# Patient Record
Sex: FEMALE | Race: WHITE | NOT HISPANIC OR LATINO | URBAN - METROPOLITAN AREA
[De-identification: names, ages, dates, MRNs, and addresses within clinical notes are randomized per-mention and may not be internally consistent; named-entity substitution may affect disease eponyms.]

---

## 2020-01-01 ENCOUNTER — HOSPITAL ENCOUNTER (INPATIENT)
Facility: OTHER | Age: 85
LOS: 1 days | DRG: 872 | End: 2020-10-13
Attending: HOSPITALIST | Admitting: HOSPITALIST
Payer: MEDICARE

## 2020-01-01 VITALS
WEIGHT: 144.19 LBS | BODY MASS INDEX: 25.55 KG/M2 | RESPIRATION RATE: 42 BRPM | HEIGHT: 63 IN | HEART RATE: 104 BPM | TEMPERATURE: 98 F | OXYGEN SATURATION: 88 % | SYSTOLIC BLOOD PRESSURE: 93 MMHG | DIASTOLIC BLOOD PRESSURE: 50 MMHG

## 2020-01-01 DIAGNOSIS — I48.91 A-FIB: ICD-10-CM

## 2020-01-01 DIAGNOSIS — B99.9 BLOOD INFECTION: Primary | ICD-10-CM

## 2020-01-01 DIAGNOSIS — A41.9 SEPSIS: ICD-10-CM

## 2020-01-01 DIAGNOSIS — I48.91 ATRIAL FIBRILLATION: ICD-10-CM

## 2020-01-01 DIAGNOSIS — R07.9 CHEST PAIN: ICD-10-CM

## 2020-01-01 DIAGNOSIS — E87.5 HYPERKALEMIA: ICD-10-CM

## 2020-01-01 LAB
ANION GAP SERPL CALC-SCNC: 15 MMOL/L (ref 8–16)
ANION GAP SERPL CALC-SCNC: 16 MMOL/L (ref 8–16)
BACTERIA #/AREA URNS HPF: ABNORMAL /HPF
BASOPHILS # BLD AUTO: 0.08 K/UL (ref 0–0.2)
BASOPHILS # BLD AUTO: ABNORMAL K/UL (ref 0–0.2)
BASOPHILS NFR BLD: 0 % (ref 0–1.9)
BASOPHILS NFR BLD: 0.4 % (ref 0–1.9)
BILIRUB UR QL STRIP: ABNORMAL
BNP SERPL-MCNC: 300 PG/ML (ref 0–99)
BUN SERPL-MCNC: 89 MG/DL (ref 8–23)
BUN SERPL-MCNC: 91 MG/DL (ref 8–23)
CALCIUM SERPL-MCNC: 10.1 MG/DL (ref 8.7–10.5)
CALCIUM SERPL-MCNC: 10.3 MG/DL (ref 8.7–10.5)
CHLORIDE SERPL-SCNC: 111 MMOL/L (ref 95–110)
CHLORIDE SERPL-SCNC: 112 MMOL/L (ref 95–110)
CLARITY UR: ABNORMAL
CO2 SERPL-SCNC: 12 MMOL/L (ref 23–29)
CO2 SERPL-SCNC: 13 MMOL/L (ref 23–29)
COLOR UR: YELLOW
CREAT SERPL-MCNC: 3.4 MG/DL (ref 0.5–1.4)
CREAT SERPL-MCNC: 3.4 MG/DL (ref 0.5–1.4)
CREAT UR-MCNC: 82.5 MG/DL (ref 15–325)
CTP QC/QA: YES
DIFFERENTIAL METHOD: ABNORMAL
DIFFERENTIAL METHOD: ABNORMAL
EOSINOPHIL # BLD AUTO: 0 K/UL (ref 0–0.5)
EOSINOPHIL # BLD AUTO: ABNORMAL K/UL (ref 0–0.5)
EOSINOPHIL NFR BLD: 0.2 % (ref 0–8)
EOSINOPHIL NFR BLD: 2 % (ref 0–8)
ERYTHROCYTE [DISTWIDTH] IN BLOOD BY AUTOMATED COUNT: 14.4 % (ref 11.5–14.5)
ERYTHROCYTE [DISTWIDTH] IN BLOOD BY AUTOMATED COUNT: 14.5 % (ref 11.5–14.5)
EST. GFR  (AFRICAN AMERICAN): 13 ML/MIN/1.73 M^2
EST. GFR  (AFRICAN AMERICAN): 13 ML/MIN/1.73 M^2
EST. GFR  (NON AFRICAN AMERICAN): 11 ML/MIN/1.73 M^2
EST. GFR  (NON AFRICAN AMERICAN): 11 ML/MIN/1.73 M^2
GLUCOSE SERPL-MCNC: 77 MG/DL (ref 70–110)
GLUCOSE SERPL-MCNC: 77 MG/DL (ref 70–110)
GLUCOSE UR QL STRIP: NEGATIVE
HCT VFR BLD AUTO: 45.5 % (ref 37–48.5)
HCT VFR BLD AUTO: 50 % (ref 37–48.5)
HGB BLD-MCNC: 15.3 G/DL (ref 12–16)
HGB BLD-MCNC: 16.9 G/DL (ref 12–16)
HGB UR QL STRIP: ABNORMAL
HYALINE CASTS #/AREA URNS LPF: 0 /LPF
IMM GRANULOCYTES # BLD AUTO: 0.12 K/UL (ref 0–0.04)
IMM GRANULOCYTES # BLD AUTO: ABNORMAL K/UL (ref 0–0.04)
IMM GRANULOCYTES NFR BLD AUTO: 0.6 % (ref 0–0.5)
IMM GRANULOCYTES NFR BLD AUTO: ABNORMAL % (ref 0–0.5)
KETONES UR QL STRIP: ABNORMAL
LACTATE SERPL-SCNC: 2.1 MMOL/L (ref 0.5–2.2)
LACTATE SERPL-SCNC: 2.8 MMOL/L (ref 0.5–2.2)
LEUKOCYTE ESTERASE UR QL STRIP: ABNORMAL
LYMPHOCYTES # BLD AUTO: 0.6 K/UL (ref 1–4.8)
LYMPHOCYTES # BLD AUTO: ABNORMAL K/UL (ref 1–4.8)
LYMPHOCYTES NFR BLD: 2.8 % (ref 18–48)
LYMPHOCYTES NFR BLD: 5 % (ref 18–48)
MCH RBC QN AUTO: 31 PG (ref 27–31)
MCH RBC QN AUTO: 31.2 PG (ref 27–31)
MCHC RBC AUTO-ENTMCNC: 33.6 G/DL (ref 32–36)
MCHC RBC AUTO-ENTMCNC: 33.8 G/DL (ref 32–36)
MCV RBC AUTO: 92 FL (ref 82–98)
MCV RBC AUTO: 92 FL (ref 82–98)
MICROSCOPIC COMMENT: ABNORMAL
MONOCYTES # BLD AUTO: 1.5 K/UL (ref 0.3–1)
MONOCYTES # BLD AUTO: ABNORMAL K/UL (ref 0.3–1)
MONOCYTES NFR BLD: 6.9 % (ref 4–15)
MONOCYTES NFR BLD: 8 % (ref 4–15)
NEUTROPHILS # BLD AUTO: 19.5 K/UL (ref 1.8–7.7)
NEUTROPHILS NFR BLD: 80 % (ref 38–73)
NEUTROPHILS NFR BLD: 89.1 % (ref 38–73)
NEUTS BAND NFR BLD MANUAL: 5 %
NITRITE UR QL STRIP: NEGATIVE
NRBC BLD-RTO: 0 /100 WBC
NRBC BLD-RTO: 0 /100 WBC
PH UR STRIP: 5 [PH] (ref 5–8)
PLATELET # BLD AUTO: 348 K/UL (ref 150–350)
PLATELET # BLD AUTO: 373 K/UL (ref 150–350)
PLATELET BLD QL SMEAR: ABNORMAL
PLATELET BLD QL SMEAR: ABNORMAL
PMV BLD AUTO: 10.1 FL (ref 9.2–12.9)
PMV BLD AUTO: 9.7 FL (ref 9.2–12.9)
POTASSIUM SERPL-SCNC: 6.5 MMOL/L (ref 3.5–5.1)
POTASSIUM SERPL-SCNC: 6.7 MMOL/L (ref 3.5–5.1)
PROCALCITONIN SERPL IA-MCNC: 8.32 NG/ML
PROT UR QL STRIP: ABNORMAL
RBC # BLD AUTO: 4.93 M/UL (ref 4–5.4)
RBC # BLD AUTO: 5.42 M/UL (ref 4–5.4)
RBC #/AREA URNS HPF: 8 /HPF (ref 0–4)
SARS-COV-2 RDRP RESP QL NAA+PROBE: NEGATIVE
SODIUM SERPL-SCNC: 139 MMOL/L (ref 136–145)
SODIUM SERPL-SCNC: 140 MMOL/L (ref 136–145)
SODIUM UR-SCNC: 24 MMOL/L (ref 20–250)
SP GR UR STRIP: 1.02 (ref 1–1.03)
SQUAMOUS #/AREA URNS HPF: 2 /HPF
TOXIC GRANULES BLD QL SMEAR: PRESENT
TROPONIN I SERPL DL<=0.01 NG/ML-MCNC: 0.35 NG/ML (ref 0–0.03)
TROPONIN I SERPL DL<=0.01 NG/ML-MCNC: 0.38 NG/ML (ref 0–0.03)
TROPONIN I SERPL DL<=0.01 NG/ML-MCNC: 0.41 NG/ML (ref 0–0.03)
URN SPEC COLLECT METH UR: ABNORMAL
UROBILINOGEN UR STRIP-ACNC: 1 EU/DL
UUN UR-MCNC: 571 MG/DL (ref 140–1050)
WBC # BLD AUTO: 20.88 K/UL (ref 3.9–12.7)
WBC # BLD AUTO: 21.81 K/UL (ref 3.9–12.7)
WBC #/AREA URNS HPF: >100 /HPF (ref 0–5)

## 2020-01-01 PROCEDURE — U0002 COVID-19 LAB TEST NON-CDC: HCPCS | Performed by: EMERGENCY MEDICINE

## 2020-01-01 PROCEDURE — 93010 EKG 12-LEAD: ICD-10-PCS | Mod: ,,, | Performed by: INTERNAL MEDICINE

## 2020-01-01 PROCEDURE — 84484 ASSAY OF TROPONIN QUANT: CPT

## 2020-01-01 PROCEDURE — 84145 PROCALCITONIN (PCT): CPT

## 2020-01-01 PROCEDURE — 82570 ASSAY OF URINE CREATININE: CPT

## 2020-01-01 PROCEDURE — 93041 RHYTHM ECG TRACING: CPT

## 2020-01-01 PROCEDURE — 84540 ASSAY OF URINE/UREA-N: CPT

## 2020-01-01 PROCEDURE — 25000003 PHARM REV CODE 250: Performed by: INTERNAL MEDICINE

## 2020-01-01 PROCEDURE — C9113 INJ PANTOPRAZOLE SODIUM, VIA: HCPCS | Performed by: INTERNAL MEDICINE

## 2020-01-01 PROCEDURE — 99285 EMERGENCY DEPT VISIT HI MDM: CPT | Mod: 25

## 2020-01-01 PROCEDURE — 99223 1ST HOSP IP/OBS HIGH 75: CPT | Mod: ,,, | Performed by: INTERNAL MEDICINE

## 2020-01-01 PROCEDURE — 83880 ASSAY OF NATRIURETIC PEPTIDE: CPT

## 2020-01-01 PROCEDURE — 94761 N-INVAS EAR/PLS OXIMETRY MLT: CPT

## 2020-01-01 PROCEDURE — 63600175 PHARM REV CODE 636 W HCPCS: Performed by: INTERNAL MEDICINE

## 2020-01-01 PROCEDURE — 85025 COMPLETE CBC W/AUTO DIFF WBC: CPT

## 2020-01-01 PROCEDURE — 36415 COLL VENOUS BLD VENIPUNCTURE: CPT

## 2020-01-01 PROCEDURE — 93010 ELECTROCARDIOGRAM REPORT: CPT | Mod: ,,, | Performed by: INTERNAL MEDICINE

## 2020-01-01 PROCEDURE — 87040 BLOOD CULTURE FOR BACTERIA: CPT

## 2020-01-01 PROCEDURE — 93005 ELECTROCARDIOGRAM TRACING: CPT

## 2020-01-01 PROCEDURE — 85027 COMPLETE CBC AUTOMATED: CPT

## 2020-01-01 PROCEDURE — 99223 PR INITIAL HOSPITAL CARE,LEVL III: ICD-10-PCS | Mod: ,,, | Performed by: INTERNAL MEDICINE

## 2020-01-01 PROCEDURE — 81000 URINALYSIS NONAUTO W/SCOPE: CPT

## 2020-01-01 PROCEDURE — 20000000 HC ICU ROOM

## 2020-01-01 PROCEDURE — 63600175 PHARM REV CODE 636 W HCPCS: Performed by: PHYSICIAN ASSISTANT

## 2020-01-01 PROCEDURE — 84300 ASSAY OF URINE SODIUM: CPT

## 2020-01-01 PROCEDURE — 25000003 PHARM REV CODE 250: Performed by: PHYSICIAN ASSISTANT

## 2020-01-01 PROCEDURE — 99900035 HC TECH TIME PER 15 MIN (STAT)

## 2020-01-01 PROCEDURE — 83605 ASSAY OF LACTIC ACID: CPT

## 2020-01-01 PROCEDURE — 80048 BASIC METABOLIC PNL TOTAL CA: CPT

## 2020-01-01 PROCEDURE — 85007 BL SMEAR W/DIFF WBC COUNT: CPT

## 2020-01-01 PROCEDURE — 87086 URINE CULTURE/COLONY COUNT: CPT

## 2020-01-01 RX ORDER — METOPROLOL TARTRATE 1 MG/ML
5 INJECTION, SOLUTION INTRAVENOUS ONCE
Status: COMPLETED | OUTPATIENT
Start: 2020-01-01 | End: 2020-01-01

## 2020-01-01 RX ORDER — VENLAFAXINE HYDROCHLORIDE 75 MG/1
150 CAPSULE, EXTENDED RELEASE ORAL DAILY
Status: DISCONTINUED | OUTPATIENT
Start: 2020-01-01 | End: 2020-10-14 | Stop reason: HOSPADM

## 2020-01-01 RX ORDER — DILTIAZEM HYDROCHLORIDE 5 MG/ML
10 INJECTION INTRAVENOUS
Status: DISCONTINUED | OUTPATIENT
Start: 2020-01-01 | End: 2020-10-14 | Stop reason: HOSPADM

## 2020-01-01 RX ORDER — SODIUM CHLORIDE 0.9 % (FLUSH) 0.9 %
10 SYRINGE (ML) INJECTION
Status: DISCONTINUED | OUTPATIENT
Start: 2020-01-01 | End: 2020-10-14 | Stop reason: HOSPADM

## 2020-01-01 RX ORDER — ONDANSETRON 2 MG/ML
4 INJECTION INTRAMUSCULAR; INTRAVENOUS EVERY 8 HOURS PRN
Status: DISCONTINUED | OUTPATIENT
Start: 2020-01-01 | End: 2020-10-14 | Stop reason: HOSPADM

## 2020-01-01 RX ORDER — TALC
6 POWDER (GRAM) TOPICAL NIGHTLY PRN
Status: DISCONTINUED | OUTPATIENT
Start: 2020-01-01 | End: 2020-10-14 | Stop reason: HOSPADM

## 2020-01-01 RX ORDER — TRAZODONE HYDROCHLORIDE 50 MG/1
50 TABLET ORAL NIGHTLY
Status: DISCONTINUED | OUTPATIENT
Start: 2020-01-01 | End: 2020-10-14 | Stop reason: HOSPADM

## 2020-01-01 RX ORDER — SODIUM CHLORIDE 9 MG/ML
INJECTION, SOLUTION INTRAVENOUS CONTINUOUS
Status: DISCONTINUED | OUTPATIENT
Start: 2020-01-01 | End: 2020-01-01

## 2020-01-01 RX ORDER — DILTIAZEM HCL 1 MG/ML
10 INJECTION, SOLUTION INTRAVENOUS CONTINUOUS
Status: DISCONTINUED | OUTPATIENT
Start: 2020-01-01 | End: 2020-10-14 | Stop reason: HOSPADM

## 2020-01-01 RX ORDER — METOPROLOL TARTRATE 25 MG/1
25 TABLET, FILM COATED ORAL 2 TIMES DAILY
Status: DISCONTINUED | OUTPATIENT
Start: 2020-01-01 | End: 2020-01-01

## 2020-01-01 RX ORDER — DONEPEZIL HYDROCHLORIDE 5 MG/1
5 TABLET, FILM COATED ORAL NIGHTLY
Status: DISCONTINUED | OUTPATIENT
Start: 2020-01-01 | End: 2020-01-01

## 2020-01-01 RX ORDER — LEVOFLOXACIN 5 MG/ML
500 INJECTION, SOLUTION INTRAVENOUS
Status: DISCONTINUED | OUTPATIENT
Start: 2020-01-01 | End: 2020-10-14 | Stop reason: HOSPADM

## 2020-01-01 RX ORDER — ACETAMINOPHEN 325 MG/1
650 TABLET ORAL EVERY 4 HOURS PRN
Status: DISCONTINUED | OUTPATIENT
Start: 2020-01-01 | End: 2020-10-14 | Stop reason: HOSPADM

## 2020-01-01 RX ORDER — PANTOPRAZOLE SODIUM 40 MG/10ML
40 INJECTION, POWDER, LYOPHILIZED, FOR SOLUTION INTRAVENOUS DAILY
Status: DISCONTINUED | OUTPATIENT
Start: 2020-01-01 | End: 2020-10-14 | Stop reason: HOSPADM

## 2020-01-01 RX ORDER — LORAZEPAM 0.5 MG/1
0.5 TABLET ORAL DAILY
Status: DISCONTINUED | OUTPATIENT
Start: 2020-01-01 | End: 2020-10-14 | Stop reason: HOSPADM

## 2020-01-01 RX ORDER — MIRTAZAPINE 15 MG/1
30 TABLET, FILM COATED ORAL NIGHTLY
Status: DISCONTINUED | OUTPATIENT
Start: 2020-01-01 | End: 2020-10-14 | Stop reason: HOSPADM

## 2020-01-01 RX ORDER — GLUCAGON 1 MG
1 KIT INJECTION
Status: DISCONTINUED | OUTPATIENT
Start: 2020-01-01 | End: 2020-10-14 | Stop reason: HOSPADM

## 2020-01-01 RX ORDER — IBUPROFEN 200 MG
24 TABLET ORAL
Status: DISCONTINUED | OUTPATIENT
Start: 2020-01-01 | End: 2020-10-14 | Stop reason: HOSPADM

## 2020-01-01 RX ORDER — CEFEPIME HYDROCHLORIDE 1 G/50ML
2 INJECTION, SOLUTION INTRAVENOUS ONCE
Status: DISCONTINUED | OUTPATIENT
Start: 2020-01-01 | End: 2020-10-14 | Stop reason: HOSPADM

## 2020-01-01 RX ORDER — ACETAMINOPHEN 325 MG/1
650 TABLET ORAL EVERY 8 HOURS PRN
Status: DISCONTINUED | OUTPATIENT
Start: 2020-01-01 | End: 2020-10-14 | Stop reason: HOSPADM

## 2020-01-01 RX ORDER — LORAZEPAM 1 MG/1
1 TABLET ORAL EVERY MORNING
Status: DISCONTINUED | OUTPATIENT
Start: 2020-01-01 | End: 2020-10-14 | Stop reason: HOSPADM

## 2020-01-01 RX ORDER — IBUPROFEN 200 MG
16 TABLET ORAL
Status: DISCONTINUED | OUTPATIENT
Start: 2020-01-01 | End: 2020-10-14 | Stop reason: HOSPADM

## 2020-01-01 RX ORDER — FUROSEMIDE 10 MG/ML
60 INJECTION INTRAMUSCULAR; INTRAVENOUS ONCE
Status: COMPLETED | OUTPATIENT
Start: 2020-01-01 | End: 2020-01-01

## 2020-01-01 RX ORDER — MUPIROCIN 20 MG/G
OINTMENT TOPICAL 2 TIMES DAILY
Status: DISCONTINUED | OUTPATIENT
Start: 2020-01-01 | End: 2020-10-14 | Stop reason: HOSPADM

## 2020-01-01 RX ADMIN — SODIUM CHLORIDE: 0.9 INJECTION, SOLUTION INTRAVENOUS at 10:10

## 2020-01-01 RX ADMIN — PANTOPRAZOLE SODIUM 40 MG: 40 INJECTION, POWDER, LYOPHILIZED, FOR SOLUTION INTRAVENOUS at 10:10

## 2020-01-01 RX ADMIN — SODIUM ZIRCONIUM CYCLOSILICATE 10 G: 10 POWDER, FOR SUSPENSION ORAL at 07:10

## 2020-01-01 RX ADMIN — LEVOFLOXACIN 500 MG: 500 INJECTION, SOLUTION INTRAVENOUS at 11:10

## 2020-01-01 RX ADMIN — LORAZEPAM 0.5 MG: 0.5 TABLET ORAL at 11:10

## 2020-01-01 RX ADMIN — VENLAFAXINE HYDROCHLORIDE 150 MG: 75 CAPSULE, EXTENDED RELEASE ORAL at 10:10

## 2020-01-01 RX ADMIN — METOPROLOL TARTRATE 5 MG: 5 INJECTION, SOLUTION INTRAVENOUS at 11:10

## 2020-01-01 RX ADMIN — DILTIAZEM HYDROCHLORIDE 10 MG: 5 INJECTION INTRAVENOUS at 03:10

## 2020-01-01 RX ADMIN — FUROSEMIDE 60 MG: 10 INJECTION, SOLUTION INTRAMUSCULAR; INTRAVENOUS at 07:10

## 2020-01-01 RX ADMIN — MIRTAZAPINE 30 MG: 15 TABLET, FILM COATED ORAL at 08:10

## 2020-01-01 RX ADMIN — DILTIAZEM HYDROCHLORIDE 10 MG/HR: 5 INJECTION INTRAVENOUS at 06:10

## 2020-01-01 RX ADMIN — DILTIAZEM HYDROCHLORIDE 10 MG: 5 INJECTION INTRAVENOUS at 05:10

## 2020-01-01 RX ADMIN — TRAZODONE HYDROCHLORIDE 50 MG: 50 TABLET ORAL at 08:10

## 2020-01-01 RX ADMIN — MUPIROCIN: 20 OINTMENT TOPICAL at 11:10

## 2020-10-13 PROBLEM — I48.91 ATRIAL FIBRILLATION WITH RAPID VENTRICULAR RESPONSE: Status: ACTIVE | Noted: 2020-01-01

## 2020-10-13 PROBLEM — A41.9 SEPSIS: Status: ACTIVE | Noted: 2020-01-01

## 2020-10-13 PROBLEM — I48.91 A-FIB: Status: ACTIVE | Noted: 2020-01-01

## 2020-10-13 PROBLEM — R19.7 DIARRHEA: Status: ACTIVE | Noted: 2020-01-01

## 2020-10-13 NOTE — PROVIDER PROGRESS NOTES - EMERGENCY DEPT.
Encounter Date: 10/13/2020    ED Physician Progress Notes         5:48 AM  Patient was a direct admission accepted by Dr. Ro from [facility] for sepsis and GI bleed.  Patient presented to the emergency department only for a COVID 19 test.  Test was negative.  Results were communicated to the hospitalist.  Patient will be transferred upstairs as planned.
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
## 2020-10-13 NOTE — HOSPITAL COURSE
"Patient admitted to inpatient status. See below.    Patient  10/13/2020 - per covering PA - "Called to patient's bedside by Timur, RN/ICU charge nurse. Per patient's RN (Cathy) the patient had been having an ABG drawn due to increased work of breathing, when it was noted that pulses were weak. RT noted difficulty in palpating a radial pulse while attempting to collect ABG, but did find one using doppler. After this, the patient was noted to be bradycardic, and did lose peripheral pulses. By the time I arrived to the bed side, she was pulseless, and tele showed no discernable rhythm. Discussed case en route to ICU with eICU, Dr. Argueta, who was present on the camera when I arrived to the patient's room. Family was notified about change of patient condition and is en route. Called ED MD (Dr. Kang) who did come to call time of death".  "

## 2020-10-13 NOTE — ASSESSMENT & PLAN NOTE
History of Anxiety and Depression  Home Meds:  Aricept 5mg qday, Trazodone 50mg qhs, Venlafaxine ER 150mg, Remeron 30mg, Ativan 1mg qAM and 0.5mg qPM  -Continue Home Meds except Aricept given interaction with Levaquin

## 2020-10-13 NOTE — PLAN OF CARE
(Physician in Lead of Transfers)   Outside Transfer Acceptance Note / Regional Referral Center      Transferring Physician: Ha Stark MD    Accepting Physician: Avelino Gonzalez MD    Date of Acceptance: 10/13/2020    Transferring Facility: [facility]    Reason for Transfer: higher level of care    Report from Transferring Physician/Hospital course:  Patient is a 89 y.o. female who has no past medical history on file presented with watery bowel movements. Patient is an elderly female with history of dementia and nursing home resident. He has been in nursing home being treated for UTI with oral antibiotics. She presented to [facility] ED with watery bloody bowel movements. She was found to have WBC count of 26K with purulent urine. She was started on IV abx with Levofloxacin due to multiple drug allergies. Stool was watery with some maroon color. Hemoglobin was 17. She appeared dehydrated with elevated Sr Cr of 2.7 above baseline of 1.7. Facility seeking transfer for GI consult and treatment of UTI. Patient is hemodynamically stable. She arrived tachycardic but HR improved down to 90's after fluid resuscitation. Facility not able to perform COVID testing thus patient will be directed to ED for screening.     Labs & Radiographs: see EPIC    Significant Labs: see EPIC    Significant Imaging: see EPIC      To Do List:   1. Admit to   2. Cont IV abx  3. Send stool for C.diff  4. Consult GI    Avelino Gonzalez MD  Hospital Medicine Staff'

## 2020-10-13 NOTE — ASSESSMENT & PLAN NOTE
2 loose BMs at Nursing Home with some blood noticed.  Had a couple of doses of Bactrim on 9/30-10/1 given LE cellulitis (?).  WBC elevated.  Hgb 17.9 in ED    Plan:  Send Stool cultures, WBC, CDiff  Place in Special Precautions Isolation for now  Hold Xarelto for now  Follow CBC q6 x 24 hours   IV PPI  GI consult

## 2020-10-13 NOTE — NURSING
Pt's HR sustaining in the 150-180s. Notified Dr. Jenkins. Diltiazem 10 mg IVP ordered for every hour as needed for HR > 120. Will administer and continue to monitor.

## 2020-10-13 NOTE — HPI
Patient is an 89 year old female with a PMH significant for Afib, Anxiety/Depression (get Ativan 1mg in AM and 0.5mg at noon daily), Dementia, Cellulitis (LE with only 2 doses of Bactrim 9/30-10/1 - stopped after ulcers in mouth and treated with Diflucan), HTN, GERD, Atrophic Vaginitis, CKD, COVID-19 in 5/2020 who resides at a Nursing Home in [place], [place] ([facility]) who presented to ED in [place] ([facility]) with 2 large loose BMs with streaks of blood in the stool, as well as low Pulse Ox.  She was found to have Acute on Chronic Kidney Disease and Afib with RVR.  She was given Verapamil in ED for RVR (10mg IV total) and started on Levaquin for ? UTI and Edwards placed. Patient transferred to [facility] for GI evaluation.  Patient is awake and in no acute distress.  She denies pain.  No chest pain or SOB.  She is oriented to person only.  Not oriented to place, month, year.  She does know president ([id number]).  No current abdominal pain.  Per Nursing home, her mental status is at baseline.

## 2020-10-13 NOTE — PLAN OF CARE
Patient received from ED, IV not intact, midline ordered and placed. Patient on cardiac monitoring with stable vital signs. Patient became tachycardic, ativan given, moring medications given with sips of water, urine sampple collected from nieves and sent to lab, no stool for culture of cdiff. Patient on precautions until sample collected. MD at bedside, ordered 5mg Lopressor IV push, med given with some reduction in heart rate, NS infusing at 75 per hour and levoquin infusing per order. Order to transfer patient to ICU. Report called to ICU, Patient placed on portable cardiac monitor and brought to Bed 2 in ICU by 2 RN's.

## 2020-10-13 NOTE — ASSESSMENT & PLAN NOTE
Creatinine 2.5 on admission from a baseline around 1.7 per Nursing Home  S/p IV fluids in ED  -Repeat creatinine and consider further work-up if needed  -I&Os  -Renal-dose medications for CrCl <20 for now  -consider Renal consult if needed

## 2020-10-13 NOTE — ED NOTES
Patient arrived from [facility] ED following report of two stools positive for occult blood passed at Nursing Home where she is a resident.  Patient was also reported to have low pulse ox at the time and was diagnosed with Sepsis.  Patient arrived via stretcher awake and alert and in NAD.

## 2020-10-13 NOTE — SUBJECTIVE & OBJECTIVE
Past Medical History:   Diagnosis Date    Anxiety 10/13/2020    Atrial fibrillation 10/13/2020    Dementia 10/13/2020    Depression     Gastro-esophageal reflux 10/13/2020    Osteoarthritis 10/13/2020    Renal disorder        History reviewed. No pertinent surgical history.    Review of patient's allergies indicates:   Allergen Reactions    Sulfamethoxazole-trimethoprim Rash    Clarithromycin     Iodine     Penicillins        No current facility-administered medications on file prior to encounter.      No current outpatient medications on file prior to encounter.     Family History     None        Tobacco Use    Smoking status: Unknown If Ever Smoked   Substance and Sexual Activity    Alcohol use: Not Currently    Drug use: Not Currently    Sexual activity: Not Currently     Review of Systems   Unable to perform ROS: Dementia   Constitutional: Negative for diaphoresis and fever.   HENT: Negative for ear pain.    Eyes: Negative for pain.   Respiratory: Negative for cough and shortness of breath.    Cardiovascular: Negative for chest pain, palpitations and leg swelling.   Gastrointestinal: Positive for blood in stool and diarrhea. Negative for abdominal pain, nausea and vomiting.   Genitourinary: Positive for difficulty urinating (Has Edwards).   Musculoskeletal: Negative for back pain.   Neurological: Positive for weakness. Negative for headaches.   Hematological: Does not bruise/bleed easily.   Psychiatric/Behavioral: Positive for confusion and dysphoric mood. Negative for agitation.     Objective:     Vital Signs (Most Recent):  Temp: 98.4 °F (36.9 °C) (10/13/20 0754)  Pulse: 109 (10/13/20 0754)  Resp: 18 (10/13/20 0754)  BP: 135/65 (10/13/20 0754)  SpO2: 97 % (10/13/20 0754) Vital Signs (24h Range):  Temp:  [98.4 °F (36.9 °C)-99.4 °F (37.4 °C)] 98.4 °F (36.9 °C)  Pulse:  [105-125] 109  Resp:  [18-20] 18  SpO2:  [96 %-97 %] 97 %  BP: (114-143)/(65-83) 135/65        There is no height or weight on file  to calculate BMI.    Physical Exam  Vitals signs reviewed.   Constitutional:       General: She is not in acute distress.     Appearance: She is ill-appearing (chronically ill appearing).   HENT:      Head: Normocephalic and atraumatic.      Right Ear: External ear normal.      Left Ear: External ear normal.      Nose: Nose normal.      Mouth/Throat:      Mouth: Mucous membranes are dry.   Eyes:      General: No scleral icterus.     Pupils: Pupils are equal, round, and reactive to light.   Neck:      Musculoskeletal: Normal range of motion and neck supple.   Cardiovascular:      Rate and Rhythm: Tachycardia present. Rhythm irregular.      Heart sounds: No murmur.   Pulmonary:      Effort: Pulmonary effort is normal. No respiratory distress.      Breath sounds: Normal breath sounds.   Abdominal:      General: Bowel sounds are normal. There is no distension.      Palpations: Abdomen is soft.      Tenderness: There is no abdominal tenderness. There is no guarding or rebound.   Musculoskeletal: Normal range of motion.      Right lower leg: No edema.      Left lower leg: No edema.   Skin:     General: Skin is warm and dry.      Coloration: Skin is not jaundiced.      Findings: No rash.   Neurological:      Mental Status: She is alert. She is disoriented.   Psychiatric:         Mood and Affect: Mood normal.         Behavior: Behavior normal.           CRANIAL NERVES     CN III, IV, VI   Pupils are equal, round, and reactive to light.       Significant Labs: All pertinent labs within the past 24 hours have been reviewed.    Significant Imaging: I have reviewed all pertinent imaging results/findings within the past 24 hours.

## 2020-10-13 NOTE — ASSESSMENT & PLAN NOTE
HR in 140s in ED.  Treated with IV Verapamil 5mg x 2 with HR down to low 100s  Labs in ED with Trop of 0.170 and [place]-BNP of 95[address] - suspect strain from RVR  CXR without edema.  Clinically dry  Home Meds:  Xarelto 20mg daily    Plan:  Holding BP meds for now.  Holding Xarelto for now  Consider adding Metoprolol for rate control if needed  Repeat EKG and Trend Trops  Check BNP  Consult Cardiology

## 2020-10-13 NOTE — ASSESSMENT & PLAN NOTE
Home Meds:  Amlodipine 10mg day and Lisinopril 20mg qday  -Hold Lisinopril given ARCELIA and Hold Amlodpine given ?Sepsis and GIB  -Follow closely

## 2020-10-13 NOTE — H&P
[facility] Medicine  History & Physical    Patient Name: Millie Garcia  MRN: [id number]  Admission Date: 10/13/2020  Attending Physician: Riley Royal MD   Primary Care Provider: Lina Kinney MD         Patient information was obtained from patient, nursing home, past medical records and ER records.     Subjective:     Principal Problem:<principal problem not specified>    Chief Complaint:   Chief Complaint   Patient presents with    Blood Infection     pt from [facility] with septic work up and abnormal labs        HPI: Patient is an 89 year old female with a PMH significant for Afib, Anxiety/Depression (get Ativan 1mg in AM and 0.5mg at noon daily), Dementia, Cellulitis (LE with only 2 doses of Bactrim 9/30-10/1 - stopped after ulcers in mouth and treated with Diflucan), HTN, GERD, Atrophic Vaginitis, CKD, COVID-19 in 5/2020 who resides at a Nursing Home in [place], [place] ([facility]) who presented to ED in [place] ([facility]) with 2 large loose BMs with streaks of blood in the stool, as well as low Pulse Ox.  She was found to have Acute on Chronic Kidney Disease and Afib with RVR.  She was given Verapamil in ED for RVR (10mg IV total) and started on Levaquin for ? UTI and Edwards placed. Patient transferred to [facility] for GI evaluation.  Patient is awake and in no acute distress.  She denies pain.  No chest pain or SOB.  She is oriented to person only.  Not oriented to place, month, year.  She does know president ([employer]).  No current abdominal pain.  Per Nursing home, her mental status is at baseline.    Past Medical History:   Diagnosis Date    Anxiety 10/13/2020    Atrial fibrillation 10/13/2020    Dementia 10/13/2020    Depression     Gastro-esophageal reflux 10/13/2020    Osteoarthritis 10/13/2020    Renal disorder        History reviewed. No pertinent surgical history.    Review of patient's allergies indicates:   Allergen Reactions     Sulfamethoxazole-trimethoprim Rash    Clarithromycin     Iodine     Penicillins        No current facility-administered medications on file prior to encounter.      No current outpatient medications on file prior to encounter.     Family History     None        Tobacco Use    Smoking status: Unknown If Ever Smoked   Substance and Sexual Activity    Alcohol use: Not Currently    Drug use: Not Currently    Sexual activity: Not Currently     Review of Systems   Unable to perform ROS: Dementia   Constitutional: Negative for diaphoresis and fever.   HENT: Negative for ear pain.    Eyes: Negative for pain.   Respiratory: Negative for cough and shortness of breath.    Cardiovascular: Negative for chest pain, palpitations and leg swelling.   Gastrointestinal: Positive for blood in stool and diarrhea. Negative for abdominal pain, nausea and vomiting.   Genitourinary: Positive for difficulty urinating (Has Edwards).   Musculoskeletal: Negative for back pain.   Neurological: Positive for weakness. Negative for headaches.   Hematological: Does not bruise/bleed easily.   Psychiatric/Behavioral: Positive for confusion and dysphoric mood. Negative for agitation.     Objective:     Vital Signs (Most Recent):  Temp: 98.4 °F (36.9 °C) (10/13/20 0754)  Pulse: 109 (10/13/20 0754)  Resp: 18 (10/13/20 0754)  BP: 135/65 (10/13/20 0754)  SpO2: 97 % (10/13/20 0754) Vital Signs (24h Range):  Temp:  [98.4 °F (36.9 °C)-99.4 °F (37.4 °C)] 98.4 °F (36.9 °C)  Pulse:  [105-125] 109  Resp:  [18-20] 18  SpO2:  [96 %-97 %] 97 %  BP: (114-143)/(65-83) 135/65        There is no height or weight on file to calculate BMI.    Physical Exam  Vitals signs reviewed.   Constitutional:       General: She is not in acute distress.     Appearance: She is ill-appearing (chronically ill appearing).   HENT:      Head: Normocephalic and atraumatic.      Right Ear: External ear normal.      Left Ear: External ear normal.      Nose: Nose normal.       Mouth/Throat:      Mouth: Mucous membranes are dry.   Eyes:      General: No scleral icterus.     Pupils: Pupils are equal, round, and reactive to light.   Neck:      Musculoskeletal: Normal range of motion and neck supple.   Cardiovascular:      Rate and Rhythm: Tachycardia present. Rhythm irregular.      Heart sounds: No murmur.   Pulmonary:      Effort: Pulmonary effort is normal. No respiratory distress.      Breath sounds: Normal breath sounds.   Abdominal:      General: Bowel sounds are normal. There is no distension.      Palpations: Abdomen is soft.      Tenderness: There is no abdominal tenderness. There is no guarding or rebound.   Musculoskeletal: Normal range of motion.      Right lower leg: No edema.      Left lower leg: No edema.   Skin:     General: Skin is warm and dry.      Coloration: Skin is not jaundiced.      Findings: No rash.   Neurological:      Mental Status: She is alert. She is disoriented.   Psychiatric:         Mood and Affect: Mood normal.         Behavior: Behavior normal.           CRANIAL NERVES     CN III, IV, VI   Pupils are equal, round, and reactive to light.       Significant Labs: All pertinent labs within the past 24 hours have been reviewed.    Significant Imaging: I have reviewed all pertinent imaging results/findings within the past 24 hours.    Assessment/Plan:     Sepsis  Per ED on Transfer:  Had Afib with RVR, elevated WBC.  Low Pox (?) at Nursing Home prior to transfer  Given IV Levaquin in ED for presumed urinary source.  Edwards placed in ED  Started on IV Fluids in ED.    CXR in ED:  No infiltrate and normal lung markings (report only)  KUB in ED:  Localized abnormal gas pattern in LUQ, central abdomen, and left abdomen with Small Bowel Dilatation.    Labs on Admission:  WBC 26.6 with Bandemia  UA with moderate leukocytes, nitrite negative, large blood, cloudy  TSH 1.0  D-dime 0.50  Pro-BNP 39619  Stool Occult Blood positive  Creatinine 2.5  Trop 0.170    Plan:  Check  Blood Culture and Urine culture  Send Procalcitonin and Lactic Acid  Will continue Levaquin for now given possible urinary source - renal dosed and hold Aricept while taking.  Continue IV fluids given creatinine          Diarrhea  2 loose BMs at Nursing Home with some blood noticed.  Had a couple of doses of Bactrim on 9/30-10/1 given LE cellulitis (?).  WBC elevated.  Hgb 17.9 in ED    Plan:  Send Stool cultures, WBC, CDiff  Place in Special Precautions Isolation for now  Hold Xarelto for now  Follow CBC q6 x 24 hours   IV PPI  GI consult        Atrial fibrillation with rapid ventricular response  HR in 140s in ED.  Treated with IV Verapamil 5mg x 2 with HR down to low 100s  Labs in ED with Trop of 0.170 [place]BNP of 99264 - suspect strain from RVR  CXR without edema.  Clinically dry  Home Meds:  Xarelto 20mg daily    Plan:  Holding BP meds for now.  Holding Xarelto for now  Consider adding Metoprolol for rate control if needed  Repeat EKG and Trend Trops  Check BNP  Consult Cardiology    Acute kidney injury superimposed on chronic kidney disease  Creatinine 2.5 on admission from a baseline around 1.7 per Nursing Home  S/p IV fluids in ED  -Repeat creatinine and consider further work-up if needed  -I&Os  -Renal-dose medications for CrCl <20 for now  -consider Renal consult if needed      Essential (primary) hypertension  Home Meds:  Amlodipine 10mg day and Lisinopril 20mg qday  -Hold Lisinopril given ARCELIA and Hold Amlodpine given ?Sepsis and GIB  -Follow closely      Dementia with behavioral disturbance  History of Anxiety and Depression  Home Meds:  Aricept 5mg qday, Trazodone 50mg qhs, Venlafaxine ER 150mg, Remeron 30mg, Ativan 1mg qAM and 0.5mg qPM  -Continue Home Meds except Aricept given interaction with Levaquin        VTE Risk Mitigation (From admission, onward)         Ordered     IP VTE HIGH RISK PATIENT  Once      10/13/20 0853     Place sequential compression device  Until discontinued      10/13/20  0853     Place ANKUR hose  Until discontinued      10/13/20 0853                   Riley Roayl MD  Department of Hospital Medicine   [facility]

## 2020-10-13 NOTE — PLAN OF CARE
Pt transferred from floor to ICU @ 1230. In A. Fib w/ RVR (-140s) upon arrival. Otherwise VSS on RA. Pt appears to be in no distress. Denies any pain or discomfort. Edwards in place and to gravity. Daughter Melita updated on ICU transfer. Bed in lowest locked position and call light within reach. NAD noted. Will continue to monitor.

## 2020-10-13 NOTE — CONSULTS
[facility]  Cardiology  Consult Note    Patient Name: Millie Garcia  MRN: [id number]  Admission Date: 10/13/2020  Hospital Length of Stay: 0 days  Code Status: DNR   Attending Provider: Riley Royal MD   Consulting Provider: Sue Jenkins MD  Primary Care Physician: Lina Kinney MD  Principal Problem:<principal problem not specified>    Patient information was obtained from past medical records and ER records.     Inpatient consult to Cardiology  Consult performed by: Sue Jenkins MD  Consult ordered by: Riley Royal MD  Reason for consult: Atrial fibrillation        Subjective:     Chief Complaint:  Weakness.     HPI:     Millie Garcia is a 89 y.o.female with hypertension, chronic kidney disease and dementia. She stays at a nursing home in [place]. She had COVID 19. She has chronic atrial fibrillation for which she is anticoagulated with rivaroxaban. According to the NH records she does not appear to be on any agents to slow her VRR. She presented to the ER in [place] after blood was noted in her stool. She had atrial fibrillation with fast VRR. The clinical impression was sepsis and she was transferred to [facility] for her further care.      Past Medical History:   Diagnosis Date    Anxiety 10/13/2020    Atrial fibrillation 10/13/2020    Dementia 10/13/2020    Depression     Gastro-esophageal reflux 10/13/2020    Osteoarthritis 10/13/2020    Renal disorder        History reviewed. No pertinent surgical history.    Review of patient's allergies indicates:   Allergen Reactions    Sulfamethoxazole-trimethoprim Rash    Clarithromycin     Iodine     Penicillins        No current facility-administered medications on file prior to encounter.      No current outpatient medications on file prior to encounter.     Family History     None        Tobacco Use    Smoking status: Unknown If Ever Smoked   Substance and Sexual Activity    Alcohol use: Not Currently    Drug use: Not  Currently    Sexual activity: Not Currently     Review of Systems   Unable to perform ROS: dementia   Constitution: Positive for malaise/fatigue.   Cardiovascular: Negative for chest pain.   Respiratory: Negative for shortness of breath.      Objective:     Vital Signs (Most Recent):  Temp: 98.6 °F (37 °C) (10/13/20 1515)  Pulse: (!) 152 (10/13/20 1737)  Resp: 20 (10/13/20 1719)  BP: (!) 142/66 (10/13/20 1719)  SpO2: (!) 91 % (10/13/20 1737) Vital Signs (24h Range):  Temp:  [97.5 °F (36.4 °C)-99.4 °F (37.4 °C)] 98.6 °F (37 °C)  Pulse:  [] 152  Resp:  [18-27] 20  SpO2:  [86 %-98 %] 91 %  BP: (107-143)/(57-86) 142/66     Weight: 65.4 kg (144 lb 2.9 oz)  Body mass index is 25.54 kg/m².    SpO2: (!) 91 %  O2 Device (Oxygen Therapy): room air      Intake/Output Summary (Last 24 hours) at 10/13/2020 1831  Last data filed at 10/13/2020 1735  Gross per 24 hour   Intake --   Output 300 ml   Net -300 ml       Lines/Drains/Airways     Drain                 Urethral Catheter 10/12/20 Straight-tip 16 Fr. 1 day          Peripheral Intravenous Line                 Midline Catheter Insertion/Assessment  - Single Lumen 10/13/20 0830 Left brachial vein  less than 1 day                Physical Exam   Constitutional: She is oriented to person, place, and time. She appears well-developed. She appears ill. She appears distressed.   HENT:   Head: Normocephalic and atraumatic.   Nose: Nose normal.   Eyes: Right eye exhibits no discharge. Left eye exhibits no discharge. Right conjunctiva is not injected. Left conjunctiva is not injected. Right pupil is round. Left pupil is round. Pupils are equal.   Neck: Neck supple. No JVD present. Carotid bruit is not present. No thyromegaly present.   Cardiovascular: S1 normal and S2 normal. An irregularly irregular rhythm present.  No extrasystoles are present. Tachycardia present. PMI is not displaced. Exam reveals no gallop.   Murmur heard.   Midsystolic murmur is present with a grade of 2/6  at the upper right sternal border.  Pulses:       Radial pulses are 2+ on the right side and 2+ on the left side.        Femoral pulses are 2+ on the right side and 2+ on the left side.       Dorsalis pedis pulses are 2+ on the right side and 2+ on the left side.        Posterior tibial pulses are 2+ on the right side and 2+ on the left side.   Pulmonary/Chest: Effort normal and breath sounds normal.   Abdominal: Soft. Normal appearance. There is no hepatosplenomegaly. There is no abdominal tenderness.   Musculoskeletal:      Right ankle: She exhibits no swelling, no ecchymosis and no deformity.      Left ankle: She exhibits no swelling, no ecchymosis and no deformity.   Lymphadenopathy:        Head (right side): No submandibular adenopathy present.        Head (left side): No submandibular adenopathy present.     She has no cervical adenopathy.   Neurological: She is alert and oriented to person, place, and time. She is not disoriented. No cranial nerve deficit.   Skin: Skin is warm, dry and intact. No rash noted. She is not diaphoretic.     Current Medications:     levoFLOXacin  500 mg Intravenous Q48H    LORazepam  0.5 mg Oral Daily    LORazepam  1 mg Oral QAM    metoprolol tartrate  25 mg Oral BID    mirtazapine  30 mg Oral QHS    mupirocin   Nasal BID    pantoprazole  40 mg Intravenous Daily    traZODone  50 mg Oral QHS    venlafaxine  150 mg Oral Daily     Current Laboratory Results:    Recent Results (from the past 24 hour(s))   POCT COVID-19 Rapid Screening    Collection Time: 10/13/20  5:44 AM   Result Value Ref Range    POC Rapid COVID Negative Negative     Acceptable Yes    Procalcitonin    Collection Time: 10/13/20 11:25 AM   Result Value Ref Range    Procalcitonin 8.32 (H) <0.25 ng/mL   Lactic acid, plasma    Collection Time: 10/13/20 11:25 AM   Result Value Ref Range    Lactate (Lactic Acid) 2.8 (H) 0.5 - 2.2 mmol/L   Troponin I    Collection Time: 10/13/20 11:25 AM   Result  Value Ref Range    Troponin I 0.384 (H) 0.000 - 0.026 ng/mL   Brain natriuretic peptide    Collection Time: 10/13/20 11:25 AM   Result Value Ref Range     (H) 0 - 99 pg/mL   CBC with Automated Differential    Collection Time: 10/13/20 11:25 AM   Result Value Ref Range    WBC 21.81 (H) 3.90 - 12.70 K/uL    RBC 5.42 (H) 4.00 - 5.40 M/uL    Hemoglobin 16.9 (H) 12.0 - 16.0 g/dL    Hematocrit 50.0 (H) 37.0 - 48.5 %    Mean Corpuscular Volume 92 82 - 98 fL    Mean Corpuscular Hemoglobin 31.2 (H) 27.0 - 31.0 pg    Mean Corpuscular Hemoglobin Conc 33.8 32.0 - 36.0 g/dL    RDW 14.5 11.5 - 14.5 %    Platelets 373 (H) 150 - 350 K/uL    MPV 10.1 9.2 - 12.9 fL    Immature Granulocytes 0.6 (H) 0.0 - 0.5 %    Gran # (ANC) 19.5 (H) 1.8 - 7.7 K/uL    Immature Grans (Abs) 0.12 (H) 0.00 - 0.04 K/uL    Lymph # 0.6 (L) 1.0 - 4.8 K/uL    Mono # 1.5 (H) 0.3 - 1.0 K/uL    Eos # 0.0 0.0 - 0.5 K/uL    Baso # 0.08 0.00 - 0.20 K/uL    nRBC 0 0 /100 WBC    Gran% 89.1 (H) 38.0 - 73.0 %    Lymph% 2.8 (L) 18.0 - 48.0 %    Mono% 6.9 4.0 - 15.0 %    Eosinophil% 0.2 0.0 - 8.0 %    Basophil% 0.4 0.0 - 1.9 %    Platelet Estimate Appears normal     Differential Method Automated    Urinalysis    Collection Time: 10/13/20  3:25 PM   Result Value Ref Range    Specimen UA Urine, Catheterized     Color, UA Yellow Yellow, Straw, Tiesha    Appearance, UA Cloudy (A) Clear    pH, UA 5.0 5.0 - 8.0    Specific Gravity, UA 1.025 1.005 - 1.030    Protein, UA 1+ (A) Negative    Glucose, UA Negative Negative    Ketones, UA Trace (A) Negative    Bilirubin (UA) 1+ (A) Negative    Occult Blood UA 3+ (A) Negative    Nitrite, UA Negative Negative    Urobilinogen, UA 1.0 <2.0 EU/dL    Leukocytes, UA 3+ (A) Negative   Urinalysis Microscopic    Collection Time: 10/13/20  3:25 PM   Result Value Ref Range    RBC, UA 8 (H) 0 - 4 /hpf    WBC, UA >100 (H) 0 - 5 /hpf    Bacteria Moderate (A) None-Occ /hpf    Squam Epithel, UA 2 /hpf    Hyaline Casts, UA 0 0-1/lpf /lpf     Microscopic Comment SEE COMMENT    Lactic acid, plasma    Collection Time: 10/13/20  3:35 PM   Result Value Ref Range    Lactate (Lactic Acid) 2.1 0.5 - 2.2 mmol/L   Troponin I    Collection Time: 10/13/20  3:36 PM   Result Value Ref Range    Troponin I 0.352 (H) 0.000 - 0.026 ng/mL   Basic Metabolic Panel    Collection Time: 10/13/20  3:36 PM   Result Value Ref Range    Sodium 139 136 - 145 mmol/L    Potassium 6.7 (HH) 3.5 - 5.1 mmol/L    Chloride 112 (H) 95 - 110 mmol/L    CO2 12 (L) 23 - 29 mmol/L    Glucose 77 70 - 110 mg/dL    BUN, Bld 89 (H) 8 - 23 mg/dL    Creatinine 3.4 (H) 0.5 - 1.4 mg/dL    Calcium 10.3 8.7 - 10.5 mg/dL    Anion Gap 15 8 - 16 mmol/L    eGFR if African American 13 (A) >60 mL/min/1.73 m^2    eGFR if non African American 11 (A) >60 mL/min/1.73 m^2   Basic metabolic panel    Collection Time: 10/13/20  4:44 PM   Result Value Ref Range    Sodium 140 136 - 145 mmol/L    Potassium 6.5 (HH) 3.5 - 5.1 mmol/L    Chloride 111 (H) 95 - 110 mmol/L    CO2 13 (L) 23 - 29 mmol/L    Glucose 77 70 - 110 mg/dL    BUN, Bld 91 (H) 8 - 23 mg/dL    Creatinine 3.4 (H) 0.5 - 1.4 mg/dL    Calcium 10.1 8.7 - 10.5 mg/dL    Anion Gap 16 8 - 16 mmol/L    eGFR if African American 13 (A) >60 mL/min/1.73 m^2    eGFR if non African American 11 (A) >60 mL/min/1.73 m^2   CBC with Automated Differential    Collection Time: 10/13/20  4:45 PM   Result Value Ref Range    WBC 20.88 (H) 3.90 - 12.70 K/uL    RBC 4.93 4.00 - 5.40 M/uL    Hemoglobin 15.3 12.0 - 16.0 g/dL    Hematocrit 45.5 37.0 - 48.5 %    Mean Corpuscular Volume 92 82 - 98 fL    Mean Corpuscular Hemoglobin 31.0 27.0 - 31.0 pg    Mean Corpuscular Hemoglobin Conc 33.6 32.0 - 36.0 g/dL    RDW 14.4 11.5 - 14.5 %    Platelets 348 150 - 350 K/uL    MPV 9.7 9.2 - 12.9 fL    Immature Granulocytes CANCELED 0.0 - 0.5 %    Immature Grans (Abs) CANCELED 0.00 - 0.04 K/uL    Lymph # CANCELED 1.0 - 4.8 K/uL    Mono # CANCELED 0.3 - 1.0 K/uL    Eos # CANCELED 0.0 - 0.5 K/uL    Baso  # CANCELED 0.00 - 0.20 K/uL    nRBC 0 0 /100 WBC    Gran% 80.0 (H) 38.0 - 73.0 %    Lymph% 5.0 (L) 18.0 - 48.0 %    Mono% 8.0 4.0 - 15.0 %    Eosinophil% 2.0 0.0 - 8.0 %    Basophil% 0.0 0.0 - 1.9 %    Bands 5.0 %    Platelet Estimate Appears normal     Toxic Granulation Present     Differential Method Manual      Current Imaging Results:    X-Ray Chest AP Portable   Final Result      Please see above         Electronically signed by: Gabriele Romero DO   Date:    10/13/2020   Time:    16:42      US Kidney    (Results Pending)       10/13/2020: ECG: Atrial fibrillation with fast VRR.    Assessment and Plan:     Active Diagnoses:    Diagnosis Date Noted POA    Sepsis [A41.9] 10/13/2020 Yes    Diarrhea [R19.7] 10/13/2020 Yes    Atrial fibrillation with rapid ventricular response [I48.91] 10/13/2020 Yes    A-fib [I48.91] 10/13/2020 Yes    Essential (primary) hypertension [I10] 09/16/2010 Yes    Dementia with behavioral disturbance [F03.91] 09/16/2010 Yes    Acute kidney injury superimposed on chronic kidney disease [N17.9, N18.9] 09/16/2010 Yes      Problems Resolved During this Admission:    Diagnosis Date Noted Date Resolved POA    Anxiety disorder, unspecified [F41.9] 09/16/2010 10/13/2020 Yes     Assessment and Plan:    1. Atrial Fibrillation    She has chronic atrial fibrillation.   At NH not been on any AV slowing medications.   At NH was on rivaroxaban 20 mg Q24.   10/12/2020: Presents with clinical urosepsis and then having -180 bpm.   As she is not hypotensive and no signs ogf HF will try control rate with diltiazem iv.   Do Echo.    2. Chronic Anticoagulation   At NH was on rivaroxaban 20 mg Q24.   Blood in the stool has been noted.   No anticoagulation for now.    3. Clinical sepsis   Clinical urosepsis.   Hydration.   Antibiotics iv.    4. Dementia   Appears moderate.    5. History of Severe Acute Respiratory Syndrome - Corona Virus 2 Infection         VTE Risk Mitigation (From admission,  onward)         Ordered     IP VTE HIGH RISK PATIENT  Once      10/13/20 0853     Place sequential compression device  Until discontinued      10/13/20 0853     Place ANKUR hose  Until discontinued      10/13/20 0853                Thank you for your consult.    I will follow-up with patient. Please contact us if you have any additional questions.    Sue Jenkins MD  Cardiology   [facility]

## 2020-10-13 NOTE — CONSULTS
.[facility]  Gastroenterology  Consult Note    Patient Name: Millie Garcia  MRN: [id number]  Admission Date: 10/13/2020  Hospital Length of Stay: 0 days  Code Status: DNR   Primary Care Physician: Lina Kinney MD  Principal Problem:<principal problem not specified>    Consults  Subjective:     Chief complaint: transferred for sepsis, blood in stool    HPI: This is an 89 y.o. woman with hx of Afib, dementia, OA, CKD, HTN, GERD, recent cellulitis who presents as a transfer for evaluation of sepsis and blood in stool. Patient is a poor historian. She tells she she has difficulty urinating and dysuria. She denies blood in the stool or abdominal pain. She denies fevers, chills. She has some associated nausea but not vomiting.  Per report, patient had 1 or 2 episodes of loose stool with streaks of blood.    She reports a prior colonoscopy >10 years ago that was normal. I am not able to confirm this.    Past medical history:  Past Medical History:   Diagnosis Date    Anxiety 10/13/2020    Atrial fibrillation 10/13/2020    Dementia 10/13/2020    Depression     Gastro-esophageal reflux 10/13/2020    Osteoarthritis 10/13/2020    Renal disorder        Past surgical history:  Appendectomy  Knee surgery    Social history:  Social History     Socioeconomic History    Marital status:      Spouse name: Not on file    Number of children: Not on file    Years of education: Not on file    Highest education level: Not on file   Occupational History    Not on file   Social Needs    Financial resource strain: Not on file    Food insecurity     Worry: Not on file     Inability: Not on file    Transportation needs     Medical: Not on file     Non-medical: Not on file   Tobacco Use    Smoking status: Unknown If Ever Smoked   Substance and Sexual Activity    Alcohol use: Not Currently    Drug use: Not Currently    Sexual activity: Not Currently   Lifestyle    Physical activity     Days per week: Not  on file     Minutes per session: Not on file    Stress: Not on file   Relationships    Social connections     Talks on phone: Not on file     Gets together: Not on file     Attends Denominational service: Not on file     Active member of club or organization: Not on file     Attends meetings of clubs or organizations: Not on file     Relationship status: Not on file   Other Topics Concern    Not on file   Social History Narrative    Not on file       Family history:  No known family hx of CRC or GI disease    Medications:  No medications prior to admission.       Allergies:  Review of patient's allergies indicates:   Allergen Reactions    Sulfamethoxazole-trimethoprim Rash    Clarithromycin     Iodine     Penicillins        Review of systems:  CONSTITUTIONAL: Negative for fever, chills, weakness, weight loss, weight gain.  HEENT: Negative for blurred vision, hearing loss, nasal congestion, dry mouth, sore throat.  CARDIOVASCULAR: Negative for chest pain or palpitations.  RESPIRATORY: Negative for SOB or cough.  GASTROINTESTINAL: See HPI  GENITOURINARY: +dysuria  MUSCULOSKELETAL: Negative for osteoarthritis or muscle pain.  SKIN: Negative for rashes/lesions.  NEUROLOGIC: Negative for headaches, numbness/tingling.  ENDOCRINE: Negative for diabetes or thyroid abnormalities.  HEMATOLOGIC: Negative for anemia or blood dyscrasias.  Aside from above positives, complete 10 point review of systems negative.    Objective:     Vital Signs (Most Recent):  Temp: 98.4 °F (36.9 °C) (10/13/20 0754)  Pulse: 109 (10/13/20 0754)  Resp: 18 (10/13/20 0754)  BP: 135/65 (10/13/20 0754)  SpO2: 97 % (10/13/20 0754) Vital Signs (24h Range):  Temp:  [98.4 °F (36.9 °C)-99.4 °F (37.4 °C)] 98.4 °F (36.9 °C)  Pulse:  [105-125] 109  Resp:  [18-20] 18  SpO2:  [96 %-97 %] 97 %  BP: (114-143)/(65-83) 135/65     Physical examination:  General: elderly, NAD, chronically ill appearing  HENT: NCAT, hearing grossly intact, no palpable or visible  thyroid mass, dry MM  Eyes: EOMI, anicteric sclera  LYMH: No cervical, supraclavicular or axillary lymphadenopathy   Cardiovascular: tachy. No murmurs appreciated.  Lungs: Non-labored respirations. Breath sounds equal.   Abdomen: soft, NTND, normoactive BS  Extremities: No C/C/E, 2+ dorsalis pedis pulses bilaterally  Neuro: alert, oriented to person and place  Psych: Appropriate mood and affect.  Skin: No jaundice, rashes or lesions  Musculoskeletal: 5/5 strength bilaterally    Labs:  SARS-COV2 negative  Labs pending.    Available outside records reviewed. Last Hgb 2018 11.4  Outside records:  WBC 26.6 with bandemia  Hgb 17.9  UA with leukocytes, nitrite neg, large blood  Pro-BNP  78926  Stool occult blood +  Creatinine 2.5  Trop 0.17    Imaging:  None      Assessment:   89 y.o. woman with     1. 2 episodes loose stool with streaks of blood; Hgb at outside facility 17, no bleeding here; recent abx usage so increased risk of C diff  2. Sepsis 2/2 UTI  3. A fib with RVR  4. Acute on chronic kidney disease  5. HTN, dementia    Plan:   - Trend H/H  - Follow up C diff and stool culture  - Will monitor for bloody stool      Thank you for your consult.     oJshua Perales MD  Gastroenterology  [facility]

## 2020-10-13 NOTE — ASSESSMENT & PLAN NOTE
Per ED on Transfer:  Had Afib with RVR, elevated WBC.  Low Pox (?) at Nursing Home prior to transfer  Given IV Levaquin in ED for presumed urinary source.  Edwards placed in ED  Started on IV Fluids in ED.    CXR in ED:  No infiltrate and normal lung markings (report only)  KUB in ED:  Localized abnormal gas pattern in LUQ, central abdomen, and left abdomen with Small Bowel Dilatation.    Labs on Admission:  WBC 26.6 with Bandemia  UA with moderate leukocytes, nitrite negative, large blood, cloudy  TSH 1.0  D-dime 0.50  Pro-BNP 15087  Stool Occult Blood positive  Creatinine 2.5  Trop 0.170    Plan:  Check Blood Culture and Urine culture  Send Procalcitonin and Lactic Acid  Will continue Levaquin for now given possible urinary source - renal dosed and hold Aricept while taking.  Continue IV fluids given creatinine

## 2020-10-14 LAB — BACTERIA UR CULT: NO GROWTH

## 2020-10-14 NOTE — ASSESSMENT & PLAN NOTE
"HR in 140s in ED.  Treated with IV Verapamil 5mg x 2 with HR down to low 100s  Labs in ED with Trop of 0.170 and pro-BNP of 35717 - suspect strain from RVR  CXR without edema on admission.  Repeat also negative for edema.  Clinically dry  Home Meds:  Xarelto 20mg daily  Trops elevated as above.    Seen by Cardiology - "Atrial Fibrillation   She has chronic atrial fibrillation.  At NH not been on any AV slowing medications.  At NH was on rivaroxaban 20 mg Q24.  10/12/2020: Presents with clinical urosepsis and then having -180 bpm.  As she is not hypotensive and no signs ogf HF will try control rate with diltiazem iv.  Do Echo".    Plan:  Rate control per Cardiology.  Held Xarelto     "

## 2020-10-14 NOTE — DISCHARGE SUMMARY
"[facility] Medicine  Discharge Summary      Patient Name: Millie Garcia  MRN: [id number]  Admission Date: 10/13/2020  Hospital Length of Stay: 1 days  Discharge Date and Time:      Attending Physician: No att. providers found   Discharging Provider: Riley Royal MD  Primary Care Provider: Lina Kinney MD      HPI:   Patient is an 89 year old female with a PMH significant for Afib, Anxiety/Depression (get Ativan 1mg in AM and 0.5mg at noon daily), Dementia, Cellulitis (LE with only 2 doses of Bactrim -10/1 - stopped after ulcers in mouth and treated with Diflucan), HTN, GERD, Atrophic Vaginitis, CKD, COVID-19 in 2020 who resides at a Nursing Home in [place], [place] ([facility]) who presented to ED in [place] ([facility]) with 2 large loose BMs with streaks of blood in the stool, as well as low Pulse Ox.  She was found to have Acute on Chronic Kidney Disease and Afib with RVR.  She was given Verapamil in ED for RVR (10mg IV total) and started on Levaquin for ? UTI and Edwards placed. Patient transferred to [facility] for GI evaluation.  Patient is awake and in no acute distress.  She denies pain.  No chest pain or SOB.  She is oriented to person only.  Not oriented to place, month, year.  She does know president (aide).  No current abdominal pain.  Per Nursing home, her mental status is at baseline.      Hospital Course:   Patient admitted to inpatient status. See below.    Patient  10/13/2020 - per covering PA - "Called to patient's bedside by SHEREEN Ding/ICU charge nurse. Per patient's RN (Cathy) the patient had been having an ABG drawn due to increased work of breathing, when it was noted that pulses were weak. RT noted difficulty in palpating a radial pulse while attempting to collect ABG, but did find one using doppler. After this, the patient was noted to be bradycardic, and did lose peripheral pulses. By the time I arrived to the bed side, she was " "pulseless, and tele showed no discernable rhythm. Discussed case en route to ICU with eICU, Dr. Argueta, who was present on the camera when I arrived to the patient's room. Family was notified about change of patient condition and is en route. Called ED MD (Dr. Kang) who did come to call time of death".     Consults:   Consults (From admission, onward)        Status Ordering Provider     Inpatient consult to Cardiology  Once     Provider:  Sue Jenkins MD    Completed TIMMY CLEMENT     Inpatient consult to Gastroenterology  Once     Provider:  Joshua Perales MD    Completed TIMMY CLEMENT          Sepsis  Per ED on Transfer:  Had Afib with RVR, elevated WBC.  Low Pox (?) at Nursing Home prior to transfer  Given IV Levaquin in ED for presumed urinary source.  Edwards placed in ED  Started on IV Fluids in ED.    CXR in ED:  No infiltrate and normal lung markings (report only)  KUB in ED:  Localized abnormal gas pattern in LUQ, central abdomen, and left abdomen with Small Bowel Dilatation.    Labs on Admission:  WBC 26.6 with Bandemia  UA with moderate leukocytes, nitrite negative, large blood, cloudy  TSH 1.0  D-dime 0.50  Pro-BNP 11768  Stool Occult Blood positive  Creatinine 2.5  Trop 0.170, 0.384, 0.352, 0.411  Procal 8.32  Lactic Acid 2.8 and 2.1    Cultures: 10/13/2020 - Blood Culture and Urine culture pending    Plan:  Continued Levaquin for now given possible urinary source - renal dosed and hold Aricept while taking.  Broadened to Cefepime in evening of 10/13 given change in condition  Continued IV fluids given creatinine          Diarrhea  2 loose BMs at Nursing Home with some blood noticed.  Had a couple of doses of Bactrim on 9/30-10/1 given LE cellulitis (?).  WBC elevated.  Hgb 17.9 in ED    Seen by GI - "- Trend H/H  - Follow up C diff and stool culture  - Will monitor for bloody stool".    Plan:  Ordered Stool cultures, WBC, CDiff  Placed in Special Precautions Isolation for now  Held " "Xarelto for now  Followed CBC q6 x 24 hours           Atrial fibrillation with rapid ventricular response  HR in 140s in ED.  Treated with IV Verapamil 5mg x 2 with HR down to low 100s  Labs in ED with Trop of 0.170 and [place]-BNP of 82907 - suspect strain from RVR  CXR without edema on admission.  Repeat also negative for edema.  Clinically dry  Home Meds:  Xarelto 20mg daily  Trops elevated as above.    Seen by Cardiology - "Atrial Fibrillation   She has chronic atrial fibrillation.  At NH not been on any AV slowing medications.  At NH was on rivaroxaban 20 mg Q24.  10/12/2020: Presents with clinical urosepsis and then having -180 bpm.  As she is not hypotensive and no signs ogf HF will try control rate with diltiazem iv.  Do Echo".    Plan:  Rate control per Cardiology.  Held Xarelto       Acute kidney injury superimposed on chronic kidney disease  Creatinine 2.5 on admission from a baseline around 1.7 per Nursing Home  S/p IV fluids in ED  -Repeat creatinine increased to 3.4 and K+ increased to 6.7 and 6.5 (no peaked TWs on Telemetry)  -Renal Consulted and started on Lokelma and Bicarb fluids.  -I&Os  -Renal-dose medications for CrCl <20 for now  -consider Renal consult if needed      Essential (primary) hypertension  Home Meds:  Amlodipine 10mg day and Lisinopril 20mg qday  -Held Lisinopril given ARCELIA and Held Amlodpine given ?Sepsis and GIB  -Follow closely      Dementia with behavioral disturbance  History of Anxiety and Depression  Home Meds:  Aricept 5mg qday, Trazodone 50mg qhs, Venlafaxine ER 150mg, Remeron 30mg, Ativan 1mg qAM and 0.5mg qPM  -Continue Home Meds except Aricept given interaction with Levaquin        Final Active Diagnoses:    Diagnosis Date Noted POA    Sepsis [A41.9] 10/13/2020 Yes    Diarrhea [R19.7] 10/13/2020 Yes    Atrial fibrillation with rapid ventricular response [I48.91] 10/13/2020 Yes    Acute kidney injury superimposed on chronic kidney disease [N17.9, N18.9] 09/16/2010 " Yes    Essential (primary) hypertension [I10] 2010 Yes    Dementia with behavioral disturbance [F03.91] 2010 Yes    A-fib [I48.91] 10/13/2020 Yes      Problems Resolved During this Admission:    Diagnosis Date Noted Date Resolved POA    Anxiety disorder, unspecified [F41.9] 2010 10/13/2020 Yes       Discharged Condition:     Disposition:       Significant Diagnostic Studies: Labs:   BMP:   Recent Labs   Lab 10/13/20  1536 10/13/20  1644   GLU 77 77    140   K 6.7* 6.5*   * 111*   CO2 12* 13*   BUN 89* 91*   CREATININE 3.4* 3.4*   CALCIUM 10.3 10.1    and CBC   Recent Labs   Lab 10/13/20  1125 10/13/20  1645   WBC 21.81* 20.88*   HGB 16.9* 15.3   HCT 50.0* 45.5   * 348       Pending Diagnostic Studies:     Procedure Component Value Units Date/Time    EKG 12-lead [[id number]]     Order Status: Sent Lab Status: No result          Medications:  None (patient  at medical facility)    Indwelling Lines/Drains at time of discharge:   Lines/Drains/Airways     None                 Time spent on the discharge of patient: 35 minutes  Patient was seen and examined on the date of discharge and determined to be suitable for discharge.         Riley Royal MD  Department of Hospital Medicine  [facility]

## 2020-10-14 NOTE — PLAN OF CARE
See previous notes. Pt TOD 2551 10/13/20. Pt en route to [facility] by security

## 2020-10-14 NOTE — PLAN OF CARE
Received call from SHEREEN Jean-Baptiste regarding hyperkalemia of 6.5. Discussed with primary attending Dr. Royal. This was a repeat lab as the sample drawn just prior was hemolyzed. The patient had not peaked T waves on tele, was asymptomatic. She had poor UOP during shift, only having made 100cc urine since 1230. Noted that Nephrology was consulted, and a Renal US was ordered. Changed US to STAT. Called and spoke with Dr. Rogelio Allison. Reviewed chart in detail. Per recs from Dr. Allison, orders were placed to d/c NS @ 75 cc/hour, and to start D5 w/ Bicarb given low bicarb level. To address the hyperkalemia, Lokelma was ordered for oral administration (Heba indicated the patient had safely swallowed liquids earlier in the shift.). Per discussions w/ both Dr. Royal and Dr. Allison, cefepime was ordered to broaden coverage of UTI. Will continue to monitor with labs at midnight.

## 2020-10-14 NOTE — PROGRESS NOTES
Afib with RVR   ARCELIA/CKD   In SOB , 30-40s RR   - get CXR, ABG   - BiPAP PRN on AVAP setting with  ml target

## 2020-10-14 NOTE — ASSESSMENT & PLAN NOTE
Home Meds:  Amlodipine 10mg day and Lisinopril 20mg qday  -Held Lisinopril given ARCELIA and Held Amlodpine given ?Sepsis and GIB  -Follow closely

## 2020-10-14 NOTE — ASSESSMENT & PLAN NOTE
Per ED on Transfer:  Had Afib with RVR, elevated WBC.  Low Pox (?) at Nursing Home prior to transfer  Given IV Levaquin in ED for presumed urinary source.  Edwards placed in ED  Started on IV Fluids in ED.    CXR in ED:  No infiltrate and normal lung markings (report only)  KUB in ED:  Localized abnormal gas pattern in LUQ, central abdomen, and left abdomen with Small Bowel Dilatation.    Labs on Admission:  WBC 26.6 with Bandemia  UA with moderate leukocytes, nitrite negative, large blood, cloudy  TSH 1.0  D-dime 0.50  Pro-BNP 08181  Stool Occult Blood positive  Creatinine 2.5  Trop 0.170, 0.384, 0.352, 0.411  Procal 8.32  Lactic Acid 2.8 and 2.1    Cultures: 10/13/2020 - Blood Culture and Urine culture pending    Plan:  Continued Levaquin for now given possible urinary source - renal dosed and hold Aricept while taking.  Broadened to Cefepime in evening of 10/13 given change in condition  Continued IV fluids given creatinine

## 2020-10-14 NOTE — SIGNIFICANT EVENT
Called to patient's bedside by Timur, RN/ICU charge nurse. Per patient's RN (Cathy) the patient had been having an ABG drawn due to increased work of breathing, when it was noted that pulses were weak. RT noted difficulty in palpating a radial pulse while attempting to collect ABG, but did find one using doppler. After this, the patient was noted to be bradycardic, and did lose peripheral pulses. By the time I arrived to the bed side, she was pulseless, and tele showed no discernable rhythm. Discussed case en route to ICU with eICU, Dr. Argueta, who was present on the camera when I arrived to the patient's room. Family was notified about change of patient condition and is en route. Called ED MD (Dr. Kang) who did come to call time of death.

## 2020-10-14 NOTE — ED PROVIDER NOTES
Called to see patient for unresponsiveness and lack of hear beat. On exam the patient did not respond to verbal or physical stimuli. Absent heart and breath sounds. Absent peripheral pulses. Pupils are fixed and dilated. Patient pronounced dead at 2206.  Hospitalist team notified.  Hospital [facility] notified family.     Ricardo Sheridan MD  10/13/20 0996

## 2020-10-14 NOTE — PROGRESS NOTES
RN spoke with EICU Dr. Argueta about patient increased Work of Breathing and Shortness of Breath with Respiratory Rate 30-40bpm.  MD ordered ABG and Bipap PRN. Respiratory Therapist noted patient was breathing and responsive upon arrival to room. Therapist attempted 3 times for ABG with no blood return each time.  RT and RN both felt for pulse and noted patient pulse to be faint so doppler was used and pulse was heard but still no blood return on each attempt. Upon the 4th attempt Resp Therapist noted patient had stopped breathing and patient was starting to carlos. At that time EICU, Charge nurse, and DIMA SWEENEY notified.

## 2020-10-14 NOTE — ASSESSMENT & PLAN NOTE
Creatinine 2.5 on admission from a baseline around 1.7 per Nursing Home  S/p IV fluids in ED  -Repeat creatinine increased to 3.4 and K+ increased to 6.7 and 6.5 (no peaked TWs on Telemetry)  -Renal Consulted and started on Lokelma and Bicarb fluids.  -I&Os  -Renal-dose medications for CrCl <20 for now  -consider Renal consult if needed

## 2020-10-14 NOTE — PROGRESS NOTES
1910  Bedside shift report received from Jerilyn Norman RN. During shift report pt alert and oriented to self but disoriented to time, place and situation. Per report this is baseline as pt has history of dementia. Pt following commands. Cardizem gtt started per BARBIE Norman. Pt noted to be in afib with RVR rate ranging from 120-160. Pt tachypneic but per report this has been pt's baseline since admit.  1919  Dr. Allison with [facility] returning call regarding consult. New orders noted  1920  Spoke with DIMA Lama PA-C regarding new orders and current vascular access. Compatibility of new orders checked with current cardizem gtt ordered. Cardizem not compatible with cefepime or bicarb. Orders received for consult to PICC team. Dr. Allison called again to confirm that PICC placement would be okay from nephrology stand point, OK received from Dr. Allison for PICC placement. House Supervisor notified of new order and PICC team called out. OK received to hold bicarb and cefepime until access is achieved and allow cardizem gtt to continue infusing.  1955  Scheduled meds administered at this time, see EMAR for details. Pt able to take PO meds well. Pt noted to have small BM. Pt cleaned and repositioned. Pt tolerated activity well, and although weak, still able to help keep herself turned on her side. Standard nieves bag changed to include urimeter. Pt complaining of feeling the need to void. Pt reassured that she has catheter. Pt continues to complain, only 5 ml noted in urimeter, bladder scan preformed, 30 ml noted in bladder.  2015  During cares pt progressively becoming more tachypneic, increased work of breathing noted with pursed lip breathing observed. eICU called into pt's room. SHEREEN Schmidt notified of pt change in status and difficulty obtaining SpO2 despite changing probes and probe sites multiple times. HR had improved from 150's to 110's and BP continues to be stable.  2035  Pt assessed by Dr. Argueta and discussed  current POC. Orders received for ABG, CXRAY and BIPAP PRN noted. RT called by charge nurse and notified of new orders.   2100  RT to bedside for ABG. Pt still responsive, following commands and speaking with staff. RT with difficulty obtaining ABG. Pulses noted to be weaker than on initial assessment.   2110  HR noted to have decreased to 80's. Cardizem paused.  2115  Pt non responsive, no palpable pulses. Pt's previously observed loud pursed lip breathing changed to agonal. BP cuff placed back on pt, reading of 57/34. Charge nurse called to bedside, DIMA Lama PA-C called to bedside. eICU called back to room.   2118  Pt bradying down. Unable to obtain anymore automatic NIBPs on pt. Pt DNR. Pt's daughter Melita called at this time by BARBIE Mayers RN, while primary nurse remained at bedside.  2121  Wide complex bradycardia observed.   2123  Asystole observed on cardiac monitor. No breath sounds or heart sounds auscultated. Dr. Sheridan, of the ED, notified of need to pronounce.   2206  Dr. Sheridan at bedside. TOD pronounced. Asystole observed in two leads and printed from zoll. VERNON and coroners office notified by charge nurse. Family en route. Pt cleaned.   2250  Pt released from .   0005  Family members at bedside.

## 2020-10-18 LAB — BACTERIA BLD CULT: NORMAL
